# Patient Record
Sex: FEMALE
[De-identification: names, ages, dates, MRNs, and addresses within clinical notes are randomized per-mention and may not be internally consistent; named-entity substitution may affect disease eponyms.]

---

## 2022-12-08 PROBLEM — Z00.00 ENCOUNTER FOR PREVENTIVE HEALTH EXAMINATION: Status: ACTIVE | Noted: 2022-12-08

## 2022-12-09 ENCOUNTER — APPOINTMENT (OUTPATIENT)
Dept: ORTHOPEDIC SURGERY | Facility: CLINIC | Age: 57
End: 2022-12-09

## 2022-12-09 DIAGNOSIS — M54.2 CERVICALGIA: ICD-10-CM

## 2022-12-09 DIAGNOSIS — S46.911A STRAIN OF UNSPECIFIED MUSCLE, FASCIA AND TENDON AT SHOULDER AND UPPER ARM LEVEL, RIGHT ARM, INITIAL ENCOUNTER: ICD-10-CM

## 2022-12-09 DIAGNOSIS — E11.9 TYPE 2 DIABETES MELLITUS W/OUT COMPLICATIONS: ICD-10-CM

## 2022-12-09 DIAGNOSIS — I10 ESSENTIAL (PRIMARY) HYPERTENSION: ICD-10-CM

## 2022-12-09 PROCEDURE — 73562 X-RAY EXAM OF KNEE 3: CPT | Mod: 50

## 2022-12-09 PROCEDURE — 99204 OFFICE O/P NEW MOD 45 MIN: CPT

## 2022-12-09 PROCEDURE — 73030 X-RAY EXAM OF SHOULDER: CPT | Mod: RT

## 2022-12-09 PROCEDURE — 72040 X-RAY EXAM NECK SPINE 2-3 VW: CPT

## 2022-12-09 RX ORDER — HYALURONATE SODIUM 30 MG/2 ML
30 SYRINGE (ML) INTRAARTICULAR
Qty: 8 | Refills: 0 | Status: ACTIVE | COMMUNITY
Start: 2022-12-09 | End: 1900-01-01

## 2022-12-09 RX ORDER — IBUPROFEN 600 MG/1
600 TABLET, FILM COATED ORAL TWICE DAILY
Qty: 60 | Refills: 2 | Status: ACTIVE | COMMUNITY
Start: 2022-12-09 | End: 1900-01-01

## 2022-12-09 NOTE — HISTORY OF PRESENT ILLNESS
[6] : 6 [5] : 5 [Dull/Aching] : dull/aching [Radiating] : radiating [Sharp] : sharp [Shooting] : shooting [Throbbing] : throbbing [de-identified] : 12/9/22:  neck, right shoulder, bilaterla knee pain [] : no [FreeTextEntry1] : right shoulder, bilateral shoulders  [FreeTextEntry7] : down the arm

## 2022-12-09 NOTE — PHYSICAL EXAM
[Disc space narrowing] : Disc space narrowing [3 ___] : forward flexion 3[unfilled]/5 [3___] : abduction 3[unfilled]/5 [NL (0)] : extension 0 degrees [4___] : quadriceps 4[unfilled]/5 [5___] : hamstring 5[unfilled]/5 [Left] : left knee [Right] : right knee [Degenerative change] : Degenerative change [] : no swelling [FreeTextEntry1] : no fx [TWNoteComboBox6] : internal rotation L4 [de-identified] : external rotation 30 degrees [TWNoteComboBox7] : flexion 110 degrees

## 2022-12-09 NOTE — ASSESSMENT
[FreeTextEntry1] : neck pain.  shooting pain.  possible hnp.\par \par right shoulder pain for two weeks without injury.  possible cuff tear.\par \par bilateral knee pain getting progressively worse.  moderate to severe knee oa.

## 2022-12-12 ENCOUNTER — FORM ENCOUNTER (OUTPATIENT)
Age: 57
End: 2022-12-12

## 2022-12-13 ENCOUNTER — APPOINTMENT (OUTPATIENT)
Dept: MRI IMAGING | Facility: CLINIC | Age: 57
End: 2022-12-13

## 2022-12-13 PROCEDURE — 73221 MRI JOINT UPR EXTREM W/O DYE: CPT | Mod: RT

## 2022-12-16 ENCOUNTER — APPOINTMENT (OUTPATIENT)
Dept: ORTHOPEDIC SURGERY | Facility: CLINIC | Age: 57
End: 2022-12-16

## 2022-12-16 VITALS — WEIGHT: 178 LBS | BODY MASS INDEX: 32.76 KG/M2 | HEIGHT: 62 IN

## 2022-12-16 DIAGNOSIS — M17.0 BILATERAL PRIMARY OSTEOARTHRITIS OF KNEE: ICD-10-CM

## 2022-12-16 DIAGNOSIS — M75.101 UNSPECIFIED ROTATOR CUFF TEAR OR RUPTURE OF RIGHT SHOULDER, NOT SPECIFIED AS TRAUMATIC: ICD-10-CM

## 2022-12-16 DIAGNOSIS — M17.9 OSTEOARTHRITIS OF KNEE, UNSPECIFIED: ICD-10-CM

## 2022-12-16 PROCEDURE — 99214 OFFICE O/P EST MOD 30 MIN: CPT

## 2022-12-16 NOTE — PHYSICAL EXAM
[Disc space narrowing] : Disc space narrowing [3 ___] : forward flexion 3[unfilled]/5 [3___] : abduction 3[unfilled]/5 [NL (0)] : extension 0 degrees [4___] : quadriceps 4[unfilled]/5 [5___] : hamstring 5[unfilled]/5 [Left] : left knee [Right] : right knee [Degenerative change] : Degenerative change [] : no swelling [FreeTextEntry1] : no fx [TWNoteComboBox6] : internal rotation L4 [de-identified] : external rotation 30 degrees [TWNoteComboBox7] : flexion 110 degrees

## 2022-12-16 NOTE — ASSESSMENT
[FreeTextEntry1] : neck pain.  shooting pain.  possible hnp.\par \par right shoulder pain for two weeks without injury.  medium cuff tear.\par \par bilateral knee pain getting progressively worse.  moderate to severe knee oa.

## 2022-12-16 NOTE — HISTORY OF PRESENT ILLNESS
[6] : 6 [5] : 5 [Dull/Aching] : dull/aching [Radiating] : radiating [Sharp] : sharp [Shooting] : shooting [Throbbing] : throbbing [Household chores] : household chores [Leisure] : leisure [Work] : work [Rest] : rest [Meds] : meds [Part time] : Work status: part time [de-identified] : 12/9/22:  neck, right shoulder, bilateral knee pain\par \par 12/16/2022: MRI results of right shoulder, bilateral knee pain.  right shoulder pain persists. 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

## 2022-12-19 RX ORDER — HYALURONATE SODIUM 30 MG/2 ML
30 SYRINGE (ML) INTRAARTICULAR
Qty: 8 | Refills: 0 | Status: ACTIVE | COMMUNITY
Start: 2022-12-19 | End: 1900-01-01